# Patient Record
Sex: MALE | Race: BLACK OR AFRICAN AMERICAN | URBAN - METROPOLITAN AREA
[De-identification: names, ages, dates, MRNs, and addresses within clinical notes are randomized per-mention and may not be internally consistent; named-entity substitution may affect disease eponyms.]

---

## 2024-02-08 ENCOUNTER — OFFICE VISIT (OUTPATIENT)
Age: 24
End: 2024-02-08

## 2024-02-08 VITALS
TEMPERATURE: 98.1 F | SYSTOLIC BLOOD PRESSURE: 114 MMHG | HEART RATE: 68 BPM | WEIGHT: 142 LBS | DIASTOLIC BLOOD PRESSURE: 72 MMHG | HEIGHT: 67 IN | BODY MASS INDEX: 22.29 KG/M2 | OXYGEN SATURATION: 98 %

## 2024-02-08 DIAGNOSIS — A64 STI (SEXUALLY TRANSMITTED INFECTION): Primary | ICD-10-CM

## 2024-02-08 LAB
SPECIMEN TYPE: NORMAL
TRICHOMONAS VAGINALIS SCREEN: NEGATIVE

## 2024-02-08 RX ORDER — CEFTRIAXONE 1 G/1
500 INJECTION, POWDER, FOR SOLUTION INTRAMUSCULAR; INTRAVENOUS ONCE
Status: COMPLETED | OUTPATIENT
Start: 2024-02-08 | End: 2024-02-08

## 2024-02-08 RX ORDER — METRONIDAZOLE 500 MG/1
2000 TABLET ORAL ONCE
Qty: 4 TABLET | Refills: 0 | Status: SHIPPED | OUTPATIENT
Start: 2024-02-08 | End: 2024-02-08

## 2024-02-08 RX ADMIN — CEFTRIAXONE 500 MG: 1 INJECTION, POWDER, FOR SOLUTION INTRAMUSCULAR; INTRAVENOUS at 19:27

## 2024-02-09 LAB
C TRACH DNA UR QL NAA+PROBE: NEGATIVE
N GONORRHOEA DNA UR QL NAA+PROBE: NEGATIVE

## 2024-02-09 NOTE — PROGRESS NOTES
Jt Sanchez (:  2000) is a 23 y.o. male,New patient, here for evaluation of the following chief complaint(s):  Exposure to STD (PT.STATES WANTS TO BE TESTED FOR STD STATES SEXUAL PARTNER TESTED POSITIVE FOR GONORRHEA  C/O DISCHARGE FROM [facility] TODAY. )      ASSESSMENT/PLAN:    ICD-10-CM    1. STI (sexually transmitted infection)  A64 C.trachomatis N.gonorrhoeae DNA, Urine     Trichomonas by EIA     metroNIDAZOLE (FLAGYL) 500 MG tablet     cefTRIAXone (ROCEPHIN) injection 500 mg          Dx Disposition: STI  Education and handout provided on diagnosis and management of symptoms.   AVS reviewed with patient. Follow up as needed in UC or with PCP for new or worsening symptoms.   Return if symptoms worsen or fail to improve.  Pt only wants a shot of Rocephin and the Flagyl    SUBJECTIVE/OBJECTIVE:  Patient presents today with complaints of wanting STI testing states partner tested positive for Gonorrhea       History provided by:  Patient   used: No    Exposure to STD  The patient's primary symptoms include penile discharge. This is a new problem. The current episode started today.       Vitals:    24 1909   BP: 114/72   Site: Left Upper Arm   Position: Sitting   Cuff Size: Medium Adult   Pulse: 68   Temp: 98.1 °F (36.7 °C)   TempSrc: Oral   SpO2: 98%   Weight: 64.4 kg (142 lb)   Height: 1.702 m (5' 7\")       Review of Systems   Genitourinary:  Positive for penile discharge.       Physical Exam  Constitutional:       Appearance: Normal appearance.   HENT:      Head: Normocephalic.      Nose: Nose normal.      Mouth/Throat:      Mouth: Mucous membranes are moist.      Pharynx: Oropharynx is clear.   Cardiovascular:      Rate and Rhythm: Normal rate and regular rhythm.      Heart sounds: Normal heart sounds.   Pulmonary:      Effort: Pulmonary effort is normal.      Breath sounds: Normal breath sounds.   Abdominal:      General: Abdomen is flat. Bowel sounds are normal.      Palpations:

## 2024-02-09 NOTE — PATIENT INSTRUCTIONS
Thank you for allowing us to care for you today and we hope you feel better soon  No intercourse for 7-10 days  Will call with test results when available  New Prescriptions    METRONIDAZOLE (FLAGYL) 500 MG TABLET    Take 4 tablets by mouth once for 1 dose